# Patient Record
Sex: MALE | Race: WHITE | HISPANIC OR LATINO | Employment: FULL TIME | URBAN - METROPOLITAN AREA
[De-identification: names, ages, dates, MRNs, and addresses within clinical notes are randomized per-mention and may not be internally consistent; named-entity substitution may affect disease eponyms.]

---

## 2018-04-26 ENCOUNTER — HOSPITAL ENCOUNTER (EMERGENCY)
Facility: MEDICAL CENTER | Age: 43
End: 2018-04-26
Attending: EMERGENCY MEDICINE
Payer: COMMERCIAL

## 2018-04-26 ENCOUNTER — APPOINTMENT (OUTPATIENT)
Dept: RADIOLOGY | Facility: MEDICAL CENTER | Age: 43
End: 2018-04-26
Attending: EMERGENCY MEDICINE
Payer: COMMERCIAL

## 2018-04-26 VITALS
SYSTOLIC BLOOD PRESSURE: 132 MMHG | OXYGEN SATURATION: 97 % | RESPIRATION RATE: 20 BRPM | WEIGHT: 183.31 LBS | BODY MASS INDEX: 30.54 KG/M2 | TEMPERATURE: 97.7 F | HEART RATE: 76 BPM | HEIGHT: 65 IN | DIASTOLIC BLOOD PRESSURE: 97 MMHG

## 2018-04-26 DIAGNOSIS — S93.105A: ICD-10-CM

## 2018-04-26 PROCEDURE — 73660 X-RAY EXAM OF TOE(S): CPT | Mod: LT

## 2018-04-26 PROCEDURE — 302875 HCHG BANDAGE ACE 4 OR 6""

## 2018-04-26 PROCEDURE — 28630 TREAT TOE DISLOCATION: CPT

## 2018-04-26 PROCEDURE — 25660 CLTX RDCRPL/NTRCRPL DISLC 1+: CPT

## 2018-04-26 PROCEDURE — 306637 HCHG MISC ORTHO ITEM RC 0274

## 2018-04-26 PROCEDURE — 700111 HCHG RX REV CODE 636 W/ 250 OVERRIDE (IP)

## 2018-04-26 PROCEDURE — 700111 HCHG RX REV CODE 636 W/ 250 OVERRIDE (IP): Performed by: EMERGENCY MEDICINE

## 2018-04-26 PROCEDURE — 96375 TX/PRO/DX INJ NEW DRUG ADDON: CPT

## 2018-04-26 PROCEDURE — 73630 X-RAY EXAM OF FOOT: CPT | Mod: LT

## 2018-04-26 PROCEDURE — 99285 EMERGENCY DEPT VISIT HI MDM: CPT

## 2018-04-26 PROCEDURE — 29515 APPLICATION SHORT LEG SPLINT: CPT

## 2018-04-26 PROCEDURE — 96374 THER/PROPH/DIAG INJ IV PUSH: CPT

## 2018-04-26 RX ORDER — IBUPROFEN 600 MG/1
600 TABLET ORAL EVERY 6 HOURS PRN
Qty: 30 TAB | Refills: 0 | Status: SHIPPED | OUTPATIENT
Start: 2018-04-26

## 2018-04-26 RX ORDER — MORPHINE SULFATE 4 MG/ML
4 INJECTION, SOLUTION INTRAMUSCULAR; INTRAVENOUS ONCE
Status: COMPLETED | OUTPATIENT
Start: 2018-04-26 | End: 2018-04-26

## 2018-04-26 RX ORDER — ONDANSETRON 2 MG/ML
4 INJECTION INTRAMUSCULAR; INTRAVENOUS ONCE
Status: COMPLETED | OUTPATIENT
Start: 2018-04-26 | End: 2018-04-26

## 2018-04-26 RX ORDER — KETOROLAC TROMETHAMINE 30 MG/ML
INJECTION, SOLUTION INTRAMUSCULAR; INTRAVENOUS
Status: COMPLETED
Start: 2018-04-26 | End: 2018-04-26

## 2018-04-26 RX ORDER — KETOROLAC TROMETHAMINE 30 MG/ML
30 INJECTION, SOLUTION INTRAMUSCULAR; INTRAVENOUS ONCE
Status: COMPLETED | OUTPATIENT
Start: 2018-04-26 | End: 2018-04-26

## 2018-04-26 RX ADMIN — ONDANSETRON 4 MG: 2 INJECTION INTRAMUSCULAR; INTRAVENOUS at 12:26

## 2018-04-26 RX ADMIN — KETOROLAC TROMETHAMINE 30 MG: 30 INJECTION, SOLUTION INTRAMUSCULAR; INTRAVENOUS at 12:27

## 2018-04-26 RX ADMIN — MORPHINE SULFATE 4 MG: 4 INJECTION INTRAVENOUS at 12:28

## 2018-04-26 ASSESSMENT — PAIN SCALES - GENERAL: PAINLEVEL_OUTOF10: 10

## 2018-04-26 NOTE — LETTER
"  FORM C-4:  EMPLOYEE’S CLAIM FOR COMPENSATION/ REPORT OF INITIAL TREATMENT  EMPLOYEE’S CLAIM - PROVIDE ALL INFORMATION REQUESTED   First Name  Tho Last Name  Ernst Birthdate             Age  1975 42 y.o. Sex  male Claim Number   Home Employee Address  [address]                                     Zip  [address] Height  1.651 m (5' 5\") Weight  83.2 kg (183 lb 5 oz) [facility]     Mailing Employee Address                           [address]               Zip  [address] Telephone  [phone] (home)  Primary Language Spoken  Fijian   Insurer  ***[employer] Third Party   [employer] Employee's Occupation (Job Title) When Injury or Occupational Disease Occurred     Employer's Name  [employer] Telephone  [phone]    Employer Address  [address] Zip  [address]   Date of Injury  4/26/2018       Hour of Injury  11:30 AM Date Employer Notified  4/26/2018 Last Day of Work after Injury or Occupational Disease  4/26/2018 Supervisor to Whom Injury Reported  Noel   Address or Location of Accident (if applicable)  [[address]]   What were you doing at the time of accident? (if applicable)  ponnajmado napoleon    How did this injury or occupational disease occur? Be specific and answer in detail. Use additional sheet if necessary)  napoleon se vesvolo y me a puco el pie   If you believe that you have an occupational disease, when did you first have knowledge of the disability and it relationship to your employment?  N/A Witnesses to the Accident  n/a     Nature of Injury or Occupational Disease  Workers' Compensation  Part(s) of Body Injured or Affected  Foot (L), N/A, N/A    I certify that the above is true and correct to the best of my knowledge and that I have provided this information in order to obtain the benefits of [place]’s [place] Insurance and " [place]  I hereby authorize any physician, chiropractor, surgeon, practitioner, or other person, any hospital, including [facility] or [facility] hospital, any medical service organization, any insurance company, or other institution or organization to release to each other, any medical or other information, including benefits paid or payable, pertinent to this injury or disease, except information relative to diagnosis, treatment and/or counseling for AIDS, psychological conditions, alcohol or controlled substances, for which I must give specific authorization.  A Photostat of this authorization shall be as valid as the original.   Date Place   Employee’s Signature   THIS REPORT MUST BE COMPLETED AND MAILED WITHIN 3 WORKING DAYS OF TREATMENT   Place  [facility], EMERGENCY DEPT  Name of Facility   [facility]   Date  4/26/2018 Diagnosis  (S93.105A) Traumatic dislocation of left great toe, initial encounter Is there evidence the injured employee was under the influence of alcohol and/or another controlled substance at the time of accident?   Hour  3:35 PM Description of Injury or Disease  Traumatic dislocation of left great toe, initial encounter No   Treatment  Metacarpal block, reduction, splint.  Have you advised the patient to remain off work five days or more?         No   X-Ray Findings  Positive   If Yes   From Date    To Date      From information given by the employee, together with medical evidence, can you directly connect this injury or occupational disease as job incurred?  Yes If No, is the employee capable of: Full Duty  No Modified Duty  Yes   Is additional medical care by a physician indicated?  Yes If Modified Duty, Specify any Limitations / Restrictions  Sit down job only for next 3-5 days until cleared by workers comp clinic     Do you know of any previous injury or  "disease contributing to this condition or occupational disease?  No   Date  4/26/2018 Print Doctor’s Name  Abhilash Meza certify the employer’s copy of this form was mailed on:   Address  [address] [place] [address] [phone] Insurer’s Use Only   [facility]  [address]    Provider’s Tax ID Number  [id number] Telephone  Dept: [phone]    Doctor’s Signature  e-ABHILASH Martinez D.O. Degree   MD    Original - TREATING PHYSICIAN OR CHIROPRACTOR   Pg 2-Insurer/TPA   Pg 3-Employer   Pg 4-Employee                                                                                                  Form C-4 (rev01/03)     BRIEF DESCRIPTION OF RIGHTS AND BENEFITS  (Pursuant to [place] 616C.050)    Notice of Injury or Occupational Disease (Incident Report Form C-1): If an injury or occupational disease (OD) arises out of and in the course of employment, you must provide written notice to your employer as soon as practicable, but no later than 7 days after the accident or OD. Your employer shall maintain a sufficient supply of the required forms.    Claim for Compensation (Form C-4): If medical treatment is sought, the form C-4 is available at the place of initial treatment. A completed \"Claim for Compensation\" (Form C-4) must be filed within 90 days after an accident or OD. The treating physician or chiropractor must, within 3 working days after treatment, complete and mail to the employer, the employer's insurer and third-party , the Claim for Compensation.    Medical Treatment: If you require medical treatment for your on-the-job injury or OD, you may be required to select a physician or chiropractor from a list provided by your workers’ compensation insurer, if it has contracted with an Organization for Managed Care (MCO) or Preferred Provider Organization (PPO) or providers of health care. If your employer has not entered into a contract with an MCO or PPO, you may " select a physician or chiropractor from the Panel of Physicians and Chiropractors. Any medical costs related to your industrial injury or OD will be paid by your insurer.    Temporary Total Disability (TTD): If your doctor has certified that you are unable to work for a period of at least 5 consecutive days, or 5 cumulative days in a 20-day period, or places restrictions on you that your employer does not accommodate, you may be entitled to TTD compensation.    Temporary Partial Disability (TPD): If the wage you receive upon reemployment is less than the compensation for TTD to which you are entitled, the insurer may be required to pay you TPD compensation to make up the difference. TPD can only be paid for a maximum of 24 months.    Permanent Partial Disability (PPD): When your medical condition is stable and there is an indication of a PPD as a result of your injury or OD, within 30 days, your insurer must arrange for an evaluation by a rating physician or chiropractor to determine the degree of your PPD. The amount of your PPD award depends on the date of injury, the results of the PPD evaluation and your age and wage.    Permanent Total Disability (PTD): If you are medically certified by a treating physician or chiropractor as permanently and totally disabled and have been granted a PTD status by your insurer, you are entitled to receive monthly benefits not to exceed 66 2/3% of your average monthly wage. The amount of your PTD payments is subject to reduction if you previously received a PPD award.    Vocational Rehabilitation Services: You may be eligible for vocational rehabilitation services if you are unable to return to the job due to a permanent physical impairment or permanent restrictions as a result of your injury or occupational disease.    Transportation and Per Deana Reimbursement: You may be eligible for travel expenses and per deana associated with medical treatment.  Reopening: You may be able to  reopen your claim if your condition worsens after claim closure.    Appeal Process: If you disagree with a written determination issued by the insurer or the insurer does not respond to your request, you may appeal to the Department of Administration, , by following the instructions contained in your determination letter. You must appeal the determination within 70 days from the date of the determination letter at [address], [address], [place], [place] [address], or [address], [address], [place], [place] [address]. If you disagree with the  decision, you may appeal to the Department of Administration, . You must file your appeal within 30 days from the date of the  decision letter at [address], [address], [place], [place] [address], or [address], [place], [place] [address]. If you disagree with a decision of an , you may file a petition for judicial review with the District Court. You must do so within 30 days of the Appeal Officer’s decision. You may be represented by an  at your own expense or you may contact the [facility] for possible representation.    [place]  for Injured Workers (NAIW): If you disagree with a  decision, you may request that NAIW represent you without charge at an  Hearing. For information regarding denial of benefits, you may contact the [facility] at: [address], [address], [place] [address], [phone], or [address], [place] [address], [phone]    To File a Complaint with the Division: If you wish to file a complaint with the  of the Division of Industrial Relations (DIR), please contact the Workers’ Compensation Section, [address], [address], [place], [place] [address], telephone [phone]) [phone], or [address], [place]  [address], telephone [phone].    For assistance with Workers’ Compensation Issues: you may contact the Office of the Governor Consumer Health Assistance, [address] [place] [address], [facility] [address], [place] [address], Toll Free 1-653.967.7169, Web site: http://[employer].[facility].[place]., E-mail [email or web address].[email or web address].                                                                                                                                                                               __________________________________________________________________                                    _________________            Employee Name / Signature                                                                                                                            Date                                       D-2 (rev. 10/07)

## 2018-04-26 NOTE — ED NOTES
Discharge instructions provided.  Rx x1 given and educated on how and when to take medication, pt educated on RICE and splint care, pt educated on CMS check, Pt verbalized the understanding of discharge instructions to follow up with PCP and to return to ER if condition worsens.  Pt ambulated out of ER using his crutches without difficulty.

## 2018-04-26 NOTE — ED NOTES
Chief Complaint   Patient presents with   • Foot Pain     Pt was working on ladder, piece of wood fell onto his L foot. L great toe visibly deformed, no open areas or bleeding.      Ice pack applied.   Pt has + sensation to distal L great toe.

## 2018-04-26 NOTE — ED NOTES
Chief Complaint   Patient presents with   • Foot Pain     Pt was working on ladder, piece of wood fell onto his L foot. L great toe visibly deformed, no open areas or bleeding.

## 2018-04-26 NOTE — ED PROVIDER NOTES
"ED Provider Note    CHIEF COMPLAINT   Chief Complaint   Patient presents with   • Foot Pain     Pt was working on ladder, piece of wood fell onto his L foot. L great toe visibly deformed, no open areas or bleeding.        HPI   Tho Dukes is a 42 y.o. male who presents to emergency room today with complaints of left great toe injury. Patient is working on a ladder part of it fell causing injury to his toe there is obvious deformity to the area. This occurred just prior to being seen emergency room while at work. Denies head or neck pain or abdominal pain or other injury at this time. Is unable to move his toe pain radiates up his toe into the base of his foot worse with movement or palpation. No numbness or tingling patient does not speak English nursing staff able to translate.    REVIEW OF SYSTEMS   See HPI for further details. All other systems are negative.     PAST MEDICAL HISTORY   No past medical history on file.    FAMILY HISTORY  No family history on file.    SOCIAL HISTORY  Social History     Social History   • Marital status:      Spouse name: N/A   • Number of children: N/A   • Years of education: N/A     Social History Main Topics   • Smoking status: Never Smoker   • Smokeless tobacco: Never Used   • Alcohol use Yes      Comment: occasional    • Drug use: No   • Sexual activity: Not on file     Other Topics Concern   • Not on file     Social History Narrative   • No narrative on file       SURGICAL HISTORY  No past surgical history on file.    CURRENT MEDICATIONS   Home Medications     Reviewed by Libra Felton (Pharmacy Tech) on 04/26/18 at 1242  Med List Status: Complete   Medication Last Dose Status        Patient Lionel Taking any Medications                       ALLERGIES   No Known Allergies    PHYSICAL EXAM  VITAL SIGNS: /97   Pulse 73   Temp 36.5 °C (97.7 °F)   Resp 20   Ht 1.651 m (5' 5\")   Wt 83.2 kg (183 lb 5 oz)   SpO2 97%   BMI 30.50 kg/m²  Room air O2: " 95    Constitutional: Well developed, Well nourished, No acute distress, Non-toxic appearance.   Cardiovascular: Normal heart rate, Normal rhythm, No murmurs, No rubs, No gallops.   Thorax & Lungs: Normal breath sounds, No respiratory distress, No wheezing, No chest tenderness.   Abdomen: Bowel sounds normal, Soft, No tenderness, No masses, No pulsatile masses.   Skin: Warm, Dry, No erythema, No rash.   Extremities: Intact distal pulses, No cyanosis, No clubbing.   Musculoskeletal: Deformity left great toe which appeared on examination of his dislocation. Sensation intact distally Refill is less than 3 seconds pulses symmetrical and intact.  Neurologic: Alert & oriented x 3, Normal motor function, Normal sensory function, No focal deficits noted.     RADIOLOGY/PROCEDURES  DX-TOE(S) 2+ LEFT   Final Result      1.  Anatomic reduction of prior LEFT first metatarsophalangeal joint dislocation      2.  No fracture      DX-FOOT-COMPLETE 3+ LEFT   Final Result      Dorsal dislocation at the LEFT first metatarsophalangeal joint            COURSE & MEDICAL DECISION MAKING  Pertinent Labs & Imaging studies reviewed. (See chart for details) he'll follow up with workers, clinic advised ice elevate rest placed in a splint with crutches. Placed on Motrin for pain. Return if further problems verbalized understanding instructions translation performed by nursing staff.        PROCEDURE NOTE; metatarsal block performed by ER physician for reduction of dislocation left great toe; patient injected with Xylocaine 2% solution total 3 mL with metatarsal block. Tolerated procedure well.    PROCEDURE NOTE; reduction of dislocation left great toe by ER physician; after performing a metatarsal block with traction and countertraction your physician was able to place the joint back into place. Patient tolerated procedure well felt relief after this had occurred x-ray showed good positioning no fracture and adequate reduction.    FINAL  IMPRESSION  1. Acute dislocation left great toe  2.   3.      Electronically signed by: Abhilash Meza, 4/26/2018 3:39 PM

## 2018-04-26 NOTE — DISCHARGE INSTRUCTIONS
Toe Dislocation  A toe dislocation happens when a bone in your toe moves out of its normal position. This injury is often caused by a direct force. It often occurs while playing sports. The injury can cause pain and swelling. You may have trouble moving your toe.  Your doctor will put the toe bone back into proper position. You may need to wear a cast or splint while the toe heals. In very bad cases, surgery may be needed.  Follow these instructions at home:  If you have a cast:  · Do not stick anything inside the cast to scratch your skin.  · Check the skin around the cast every day. Tell your doctor about any concerns. You may put lotion on dry skin around the edges of the cast, but do not put lotion on the skin under the cast.  · Do not let your cast get wet if it is not waterproof.  · Keep the cast clean.  If you have a splint:  · Wear the splint as told by your doctor. Remove it only as told by your doctor.  · Loosen the splint if your toes tingle, get numb, or turn cold and blue.  · Do not let your splint get wet if it is not waterproof.  · Keep the splint clean.  Bathing  · Do not take baths, swim, or use a hot tub until your doctor says it is okay. Ask your doctor if you can take showers. You may only be allowed to take sponge baths for bathing.  · If your cast or splint is not waterproof, cover it with a watertight plastic bag when you take a bath or a shower.  Managing pain, stiffness, and swelling  · If directed, put ice on the injured area.  ¨ Put ice in a plastic bag.  ¨ Place a towel between your skin and the bag.  ¨ Leave the ice on for 20 minutes, 2-3 times per day.  · Move your toes often to avoid stiffness and to lessen swelling.  · Raise (elevate) the injured area above the level of your heart while you are sitting or lying down.  Driving  · Do not drive or use heavy machinery while taking prescription pain medicine.  · Ask your doctor when it is safe to drive if you have a cast or splint on a  foot that you use for driving.  Activity  · Rest your injured joint.  · Return to your normal activities as told by your doctor. Ask your doctor what activities are safe for you.  · When your doctor says it is okay, begin doing gentle exercises to lessen stiffness.  General instructions  · Do not put pressure on any part of the cast or splint until it is fully hardened. This may take many hours.  · Take over-the-counter and prescription medicines only as told by your doctor.  · If your doctor taped your injured toe to a toe that is next to it (did santosh taping), change the tape as told by your doctor.  · Do not use any tobacco products. These include cigarettes, chewing tobacco, or e-cigarettes. Tobacco can delay bone healing. IF you need help quitting, ask your doctor.  · Keep all follow-up visits as told by your doctor. This is important.  Contact a doctor if:  · Your toe looks crooked or out of position.  · You have other signs that your toe is dislocated again, such as:  ¨ Swelling.  ¨ Tenderness.  · You have new pain or pain that gets worse.  · Your cast or splint gets loose or damaged.  Get help right away if:  · Your pain gets very bad.  · You lose feeling in your toe.  · You cannot bend the tip of your toe.  · Your toe or foot feels cool and turns pale in color.  This information is not intended to replace advice given to you by your health care provider. Make sure you discuss any questions you have with your health care provider.  Document Released: 08/29/2012 Document Revised: 08/21/2017 Document Reviewed: 06/22/2016  Elsevier Interactive Patient Education © 2017 Elsevier Inc.

## 2018-04-30 ENCOUNTER — OCCUPATIONAL MEDICINE (OUTPATIENT)
Dept: URGENT CARE | Facility: CLINIC | Age: 43
End: 2018-04-30
Payer: COMMERCIAL

## 2018-04-30 VITALS
BODY MASS INDEX: 28.72 KG/M2 | RESPIRATION RATE: 14 BRPM | HEIGHT: 67 IN | OXYGEN SATURATION: 96 % | SYSTOLIC BLOOD PRESSURE: 138 MMHG | HEART RATE: 71 BPM | TEMPERATURE: 98.7 F | DIASTOLIC BLOOD PRESSURE: 92 MMHG | WEIGHT: 183 LBS

## 2018-04-30 DIAGNOSIS — S93.105D: ICD-10-CM

## 2018-04-30 PROCEDURE — 99203 OFFICE O/P NEW LOW 30 MIN: CPT | Mod: 29 | Performed by: NURSE PRACTITIONER

## 2018-04-30 NOTE — LETTER
[facility] [address] [place] [address]  Phone:  [phone] - Fax:  [phone]   [facility] Progress Report and Disability Certification  Date of Service: 4/30/2018   No Show:  No  Date / Time of Next Visit: 5/3/2018   Claim Information   Patient Name: Tho Dukes  Claim Number:     Employer:    Date of Injury: 4/26/2018     Insurer / TPA: [employer]  ID / SSN:     Occupation:   Diagnosis: The encounter diagnosis was Traumatic dislocation of left great toe, subsequent encounter.    Medical Information   Related to Industrial Injury? Yes    Subjective Complaints:  DOI: 4/26/18- Patient was working on a ladder and part of it fell causing injury to his toe. There was obvious deformity to the area and dislocation of the toe was reduced in the ED 4 days ago. No numbness or tingling. Patient reports improvement of pain but it is still painful to bear weight on foot. Patient does not speak English, language phone to translate.   Objective Findings: Left great toe- STS, generalized ecchymosis, TTP. Decreased ROM secondary to swelling and pain. Joint alignment maintained. Distal CMS intact. Antalgic gait.   Pre-Existing Condition(s):     Assessment:   Condition Improved    Status: Additional Care Required  Permanent Disability:No    Plan:      Diagnostics:      Comments:  Short CAM walking boot applied today  TTWB with crutches  Alternate Tylenol and Ibuprofen as needed for pain  Follow up with Sports Medicine in 3 days      Disability Information   Status: Released to Restricted Duty    From:  4/23/2018  Through: 5/3/2018 Restrictions are: Temporary   Physical Restrictions   Sitting:    Standing:    Stooping:    Bending:      Squatting:    Walking:    Climbing:    Pushing:      Pulling:    Other:    Reaching Above Shoulder (L):   Reaching Above Shoulder (R):       Reaching Below Shoulder (L):    Reaching Below Shoulder (R):      Not  to exceed Weight Limits   Carrying(hrs):   Weight Limit(lb):   Lifting(hrs):   Weight  Limit(lb):     Comments: 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 No construction site work until cleared by Sports Med.    Repetitive Actions   Hands: i.e. Fine Manipulations from Grasping:     Feet: i.e. Operating Foot Controls:     Driving / Operate Machinery:     Physician Name: NEERU Schuster Physician Signature: keithSignALEXANDRIA JAEGER e-Signature: Dr. Randall Weaver, Medical Director   Clinic Name / Location: [address] [place] [address] [place] [address] Clinic Phone Number: Dept: [phone]   Appointment Time: 9:00 Am Visit Start Time: 10:02 AM   Check-In Time:  8:59 Am Visit Discharge Time:  10:30 AM   Original-Treating Physician or Chiropractor    Page 2-Insurer/TPA    Page 3-Employer    Page 4-Employee

## 2018-04-30 NOTE — PROGRESS NOTES
"Subjective:      Tho Dukes is a 42 y.o. male who presents with Toe Pain (wc fv left big toe)      DOI: 4/26/18- Patient was working on a ladder and part of it fell causing injury to his toe. There was obvious deformity to the area and dislocation of the toe was reduced in the ED 4 days ago. No numbness or tingling. Patient reports improvement of pain but it is still painful to bear weight on foot. Patient does not speak English, language phone to translate.     HPI    Review of Systems   Musculoskeletal: Positive for joint pain (left great toe).   All other systems reviewed and are negative.    No past medical history on file. No past surgical history on file.   Social History     Social History   • Marital status:      Spouse name: N/A   • Number of children: N/A   • Years of education: N/A     Occupational History   • Not on file.     Social History Main Topics   • Smoking status: Never Smoker   • Smokeless tobacco: Never Used   • Alcohol use Yes      Comment: occasional    • Drug use: No   • Sexual activity: Not on file     Other Topics Concern   • Not on file     Social History Narrative   • No narrative on file          Objective:     /92   Pulse 71   Temp 37.1 °C (98.7 °F)   Resp 14   Ht 1.702 m (5' 7\")   Wt 83 kg (183 lb)   SpO2 96%   BMI 28.66 kg/m²      Physical Exam   Constitutional: He is oriented to person, place, and time. Vital signs are normal. He appears well-developed and well-nourished.   HENT:   Head: Normocephalic and atraumatic.   Eyes: EOM are normal. Pupils are equal, round, and reactive to light.   Neck: Normal range of motion.   Cardiovascular: Normal rate and regular rhythm.    Pulmonary/Chest: Effort normal.   Musculoskeletal:        Left foot: There is decreased range of motion, tenderness and swelling.        Feet:    Neurological: He is alert and oriented to person, place, and time.   Skin: Skin is warm and dry. Capillary refill takes less than 2 seconds.   "   Psychiatric: He has a normal mood and affect. His speech is normal and behavior is normal. Thought content normal.   Vitals reviewed.      Left great toe- STS, generalized ecchymosis, TTP. Decreased ROM secondary to swelling and pain. Joint alignment maintained. Distal CMS intact. Antalgic gait.       Assessment/Plan:     1. Traumatic dislocation of left great toe, subsequent encounter  - REFERRAL TO SPORTS MEDICINE    Old splint removed  Placed in short CAM walking boot  TTWB and crutches  Follow up with Sports Med in 3 days